# Patient Record
Sex: MALE | Race: WHITE | NOT HISPANIC OR LATINO | URBAN - METROPOLITAN AREA
[De-identification: names, ages, dates, MRNs, and addresses within clinical notes are randomized per-mention and may not be internally consistent; named-entity substitution may affect disease eponyms.]

---

## 2024-05-02 ENCOUNTER — EMERGENCY (EMERGENCY)
Facility: HOSPITAL | Age: 49
LOS: 1 days | Discharge: HOME | End: 2024-05-02
Attending: EMERGENCY MEDICINE
Payer: COMMERCIAL

## 2024-05-02 VITALS
SYSTOLIC BLOOD PRESSURE: 129 MMHG | WEIGHT: 197.75 LBS | OXYGEN SATURATION: 97 % | HEIGHT: 71 IN | HEART RATE: 76 BPM | TEMPERATURE: 98 F | DIASTOLIC BLOOD PRESSURE: 72 MMHG | RESPIRATION RATE: 18 BRPM

## 2024-05-02 VITALS
TEMPERATURE: 98 F | RESPIRATION RATE: 19 BRPM | HEART RATE: 61 BPM | DIASTOLIC BLOOD PRESSURE: 74 MMHG | OXYGEN SATURATION: 97 % | SYSTOLIC BLOOD PRESSURE: 126 MMHG

## 2024-05-02 PROCEDURE — 26770 TREAT FINGER DISLOCATION: CPT | Mod: F3

## 2024-05-02 PROCEDURE — 73140 X-RAY EXAM OF FINGER(S): CPT

## 2024-05-02 PROCEDURE — 73140 X-RAY EXAM OF FINGER(S): CPT | Mod: 26,LT

## 2024-05-02 PROCEDURE — 99284 EMERGENCY DEPT VISIT MOD MDM: CPT | Mod: 57

## 2024-05-02 PROCEDURE — 99283 EMERGENCY DEPT VISIT LOW MDM: CPT | Mod: 25

## 2024-05-02 PROCEDURE — 26770 TREAT FINGER DISLOCATION: CPT | Mod: 54,F3

## 2024-05-02 RX ORDER — ACETAMINOPHEN 500 MG
975 TABLET ORAL ONCE
Refills: 0 | Status: COMPLETED | OUTPATIENT
Start: 2024-05-02 | End: 2024-05-02

## 2024-05-02 RX ADMIN — Medication 975 MILLIGRAM(S): at 01:36

## 2024-05-02 NOTE — ED ADULT NURSE NOTE - OBJECTIVE STATEMENT
47 y/o M presents to the ED with complaints of left 4th finger pain s/p injury. Pt reports that he had been playing hockey and had fell. Pt reports catching himself, and denies head strike and LOC, and blood thinners. Pt A&Ox3 gross neuro intact, no difficulty speaking in complete sentences.

## 2024-05-02 NOTE — ED PROVIDER NOTE - NSFOLLOWUPINSTRUCTIONS_ED_ALL_ED_FT
You were seen in the ER today for finger dislocation.    You had an xray done which was discussed with you.    Please follow up with the hand specialist - call the office to make an appointment.    Keep the finger in the splint for at least the next week, or until you are cleared by the hand specialist.    For pain, please take acetaminophen 650 mg every 6 hours for pain. Additionally, you can also take ibuprofen 600 mg every 6 hours for pain.    Return to the ER for any worsening symptoms or concerns, including chest pain, shortness of breath, lightheadedness, weakness, or any other concerns.

## 2024-05-02 NOTE — ED ADULT TRIAGE NOTE - CHIEF COMPLAINT QUOTE
injured left ring finger during ice hockey game; finger has swelling; cool pack in place; has pt taped 2 fingers together for splint

## 2024-05-02 NOTE — ED PROVIDER NOTE - CARE PROVIDER_API CALL
Marc Her  Plastic Surgery  [address], [address]  [place] [address]  Phone: [phone]  Fax: [phone]  Follow Up Time: 4-6 Days

## 2024-05-02 NOTE — ED PROVIDER NOTE - OBJECTIVE STATEMENT
Attend[age]-year-old male with no past medical history presents with finger injury playing hockey.  Patient collided with another player and stubbed his finger.  He has pain and swelling to the proximal left fourth digit.  He has pain with flexion.

## 2024-05-02 NOTE — ED PROVIDER NOTE - PROGRESS NOTE DETAILS
Kelly Meredith DO PGY-3  patient's finger has been reduced, intact motor and sensation of the L 4th digit. able to flex and extend at DIP and PIP fully with 5/5 strength.

## 2024-05-02 NOTE — ED ADULT NURSE NOTE - CHPI ED NUR SYMPTOMS NEG
no abrasion/no bleeding/no confusion/no loss of consciousness/no numbness/no tingling/no vomiting/no weakness

## 2024-05-02 NOTE — ED PROVIDER NOTE - PATIENT PORTAL LINK FT
You can access the FollowMyHealth Patient Portal offered by [facility] by registering at the following website: [email or web address]. By joining [employer]’s FollowMyHealth portal, you will also be able to view your health information using other applications (apps) compatible with our system.

## 2024-05-03 PROBLEM — Z78.9 OTHER SPECIFIED HEALTH STATUS: Chronic | Status: ACTIVE | Noted: 2024-05-02

## 2024-05-09 PROBLEM — Z00.00 ENCOUNTER FOR PREVENTIVE HEALTH EXAMINATION: Status: ACTIVE | Noted: 2024-05-09

## 2024-05-15 ENCOUNTER — APPOINTMENT (OUTPATIENT)
Dept: PLASTIC SURGERY | Facility: CLINIC | Age: 49
End: 2024-05-15